# Patient Record
Sex: MALE | Race: BLACK OR AFRICAN AMERICAN | NOT HISPANIC OR LATINO | Employment: UNEMPLOYED | URBAN - METROPOLITAN AREA
[De-identification: names, ages, dates, MRNs, and addresses within clinical notes are randomized per-mention and may not be internally consistent; named-entity substitution may affect disease eponyms.]

---

## 2018-10-30 ENCOUNTER — HOSPITAL ENCOUNTER (EMERGENCY)
Facility: HOSPITAL | Age: 23
End: 2018-10-30
Attending: EMERGENCY MEDICINE
Payer: MEDICAID

## 2018-10-30 VITALS
WEIGHT: 148.81 LBS | TEMPERATURE: 98 F | OXYGEN SATURATION: 100 % | DIASTOLIC BLOOD PRESSURE: 67 MMHG | HEIGHT: 69 IN | HEART RATE: 61 BPM | RESPIRATION RATE: 18 BRPM | BODY MASS INDEX: 22.04 KG/M2 | SYSTOLIC BLOOD PRESSURE: 120 MMHG

## 2018-10-30 DIAGNOSIS — Z72.0 TOBACCO ABUSE: Primary | ICD-10-CM

## 2018-10-30 DIAGNOSIS — R03.0 ELEVATED BLOOD PRESSURE READING: ICD-10-CM

## 2018-10-30 DIAGNOSIS — T81.49XA SURGICAL WOUND INFECTION: ICD-10-CM

## 2018-10-30 LAB
ALBUMIN SERPL BCP-MCNC: 3.5 G/DL
ALP SERPL-CCNC: 46 U/L
ALT SERPL W/O P-5'-P-CCNC: 14 U/L
ANION GAP SERPL CALC-SCNC: 9 MMOL/L
AST SERPL-CCNC: 17 U/L
BASOPHILS # BLD AUTO: 0.03 K/UL
BASOPHILS NFR BLD: 0.5 %
BILIRUB SERPL-MCNC: 0.4 MG/DL
BUN SERPL-MCNC: 10 MG/DL
CALCIUM SERPL-MCNC: 9.6 MG/DL
CHLORIDE SERPL-SCNC: 104 MMOL/L
CO2 SERPL-SCNC: 25 MMOL/L
CREAT SERPL-MCNC: 1.1 MG/DL
CRP SERPL-MCNC: 4.7 MG/L
DIFFERENTIAL METHOD: NORMAL
EOSINOPHIL # BLD AUTO: 0.1 K/UL
EOSINOPHIL NFR BLD: 1.2 %
ERYTHROCYTE [DISTWIDTH] IN BLOOD BY AUTOMATED COUNT: 12.6 %
ERYTHROCYTE [SEDIMENTATION RATE] IN BLOOD BY WESTERGREN METHOD: 58 MM/HR
EST. GFR  (AFRICAN AMERICAN): >60 ML/MIN/1.73 M^2
EST. GFR  (NON AFRICAN AMERICAN): >60 ML/MIN/1.73 M^2
GLUCOSE SERPL-MCNC: 91 MG/DL
HCT VFR BLD AUTO: 46.5 %
HGB BLD-MCNC: 15.8 G/DL
LYMPHOCYTES # BLD AUTO: 2.1 K/UL
LYMPHOCYTES NFR BLD: 32.2 %
MCH RBC QN AUTO: 30.3 PG
MCHC RBC AUTO-ENTMCNC: 34 G/DL
MCV RBC AUTO: 89 FL
MONOCYTES # BLD AUTO: 0.5 K/UL
MONOCYTES NFR BLD: 8.4 %
NEUTROPHILS # BLD AUTO: 3.7 K/UL
NEUTROPHILS NFR BLD: 57.5 %
PLATELET # BLD AUTO: 227 K/UL
PMV BLD AUTO: 9.5 FL
POTASSIUM SERPL-SCNC: 4 MMOL/L
PROT SERPL-MCNC: 8.2 G/DL
RBC # BLD AUTO: 5.22 M/UL
SODIUM SERPL-SCNC: 138 MMOL/L
WBC # BLD AUTO: 6.45 K/UL

## 2018-10-30 PROCEDURE — 87070 CULTURE OTHR SPECIMN AEROBIC: CPT | Mod: 59

## 2018-10-30 PROCEDURE — 80053 COMPREHEN METABOLIC PANEL: CPT

## 2018-10-30 PROCEDURE — 85652 RBC SED RATE AUTOMATED: CPT

## 2018-10-30 PROCEDURE — 85025 COMPLETE CBC W/AUTO DIFF WBC: CPT

## 2018-10-30 PROCEDURE — 87040 BLOOD CULTURE FOR BACTERIA: CPT

## 2018-10-30 PROCEDURE — 87186 SC STD MICRODIL/AGAR DIL: CPT

## 2018-10-30 PROCEDURE — 86140 C-REACTIVE PROTEIN: CPT

## 2018-10-30 PROCEDURE — 25000003 PHARM REV CODE 250: Performed by: EMERGENCY MEDICINE

## 2018-10-30 PROCEDURE — 87077 CULTURE AEROBIC IDENTIFY: CPT

## 2018-10-30 PROCEDURE — 99284 EMERGENCY DEPT VISIT MOD MDM: CPT | Mod: 25

## 2018-10-30 PROCEDURE — 63600175 PHARM REV CODE 636 W HCPCS: Performed by: EMERGENCY MEDICINE

## 2018-10-30 PROCEDURE — 96365 THER/PROPH/DIAG IV INF INIT: CPT

## 2018-10-30 RX ORDER — VANCOMYCIN HCL IN 5 % DEXTROSE 1G/250ML
15 PLASTIC BAG, INJECTION (ML) INTRAVENOUS
Status: COMPLETED | OUTPATIENT
Start: 2018-10-30 | End: 2018-10-30

## 2018-10-30 RX ADMIN — VANCOMYCIN HYDROCHLORIDE 1000 MG: 1 INJECTION, POWDER, LYOPHILIZED, FOR SOLUTION INTRAVENOUS at 08:10

## 2018-10-30 NOTE — ED NOTES
"Confirmed with [facility] that ROCIO was received.  Per request of Dr. Berg, attempted to get in touch with surgeon that performed pts procedure. Was given the number to [facility] [phone], person answering the phone stated she couldn't help me unless I knew the name of the surgeon. Stated "we don't have access to that kind of information." Dr. Berg aware.   "

## 2018-10-30 NOTE — ED NOTES
Release of information has been signed by pt, faxed to [facility] medical records department as requested by Dr. Berg.

## 2018-10-30 NOTE — ED NOTES
Pt c/o R hand swelling and infection where pt had hand fracture surgery 2 months ago. Pt reports taking his cast off 3 weeks ago on his own, and did not follow up after surgery. Has not put any ointment on incision wound or taken any medication for pain. Noticed drainage 4 days ago.     Level of Consciousness: Patient is awake, alert, oriented to person, place, time, and situation.    Appearance: Pt sitting comfortably in stretcher, no acute distress at this time. Clothing appropriately placed and clean. Hygiene is appropriate.   Skin: Skin is warm, dry. Skin turgor is normal/elastic. Mucous membranes moist. Skin color is normal for ethnicity. Swelling and open wound approx 2 cm noted to dorsal R thumb where surgical incision was. Purulent and crusted drainage noted to site.   Musculoskeletal: Moves all extremities well. Full active ROM. Swelling noted to R dorsal aspect of 1st metacarpal area.  Denies any weakness. Gait steady, ambulates without use of assistive devices.   Respiratory: Airway open and patent. Respirations equal and unlabored. Breath sounds clear to auscultation. Denies any SOB.   Cardiac: Regular rate and rhythm.  Radial and pedal pulses present and normal. Capillary refill is within normal limits. Denies chest pain.    GI: Abdomen soft, non-tender to all quadrants with palpation. Bowel sounds present and active in all quads. Abdomen symmetric with no distention noted. Denies any N/V/D.    Neurological: Symmetrical expressions noted to face.  No obvious neurological deficits noted.   Psychosocial: Speech spontaneous, clear, and coherent. Appropriate to situation. Pt is calm and cooperative.     Pt informed of plan of care, verbalizes understanding, and denies any other questions, complaints, or concerns at this time. Bed in locked in lowest position, siderails up x2, call light within reach.  Will continue to monitor.

## 2018-10-30 NOTE — ED NOTES
Wound cleaned with hibiclens to obtain new drainage from site, but unable to express any purulent or bloody drainage from site. MD aware. States to wait a few minutes and see if it starts to drain on its own. Surgical pin is slightly visible with manipulation of superficial tissue. MD aware.

## 2018-10-30 NOTE — ED PROVIDER NOTES
History     Chief Complaint   Patient presents with    Abscess     reports breaking thumb and had cast on, but cut it off because he was aggravating him. Now thinks his pins are infected. Swelling with some drainage present to surgical site. Went to [facility] ED last night and LWBS.        Review of patient's allergies indicates:   Allergen Reactions    Banana Swelling     itching       History of Present Illness   HPI    10/30/2018, 7:05 AM  The history is provided by the patient    Cash Leon is a 23 y.o. male presenting to the ED for right hand swelling.  Patient is a right-hand dominant 23-year-old male presenting to the emergency room with right hand swelling.  Patient reports that he had fractured his hand approximately 2 months ago.  He was seen at [facility] in [place].  Patient reports that he has pins in his hand.  He states that he had his cast on up until approximately 3 weeks ago.  He states that he had removed his cast about 3 weeks ago.  He did not follow up with his orthopedist.  Reports that approximately 7 days ago, he started having swelling and pain to the right hand.  It is described as throbbing.  Nothing made it better, nothing made it worse.  He states that it started draining yellow pus.  Patient denies any fever, chills, body aches, numbness or tingling to the fingers.  There has been no prior treatment.      Arrival mode:  Personal Vehicle    PCP: Primary Doctor No     Allergies:  Review of patient's allergies indicates:   Allergen Reactions    Banana Swelling     itching       Past Medical History:  History reviewed. No pertinent past medical history.    Past Surgical History:  Past Surgical History:   Procedure Laterality Date    FRACTURE SURGERY      right hand         Family History:  History reviewed. No pertinent family history.    Social History:  Social History     Tobacco Use    Smoking status: Current Every Day Smoker     Packs/day: 1.00      Years: 10.00     Pack years: 10.00     Types: Cigarettes    Smokeless tobacco: Never Used   Substance and Sexual Activity    Alcohol use: No     Frequency: Never    Drug use: No    Sexual activity: Not on file        Review of Systems   Review of Systems   Constitutional: Negative for fever.   HENT: Negative for sore throat.    Eyes: Negative for redness.   Respiratory: Negative for shortness of breath.    Cardiovascular: Negative for chest pain.   Gastrointestinal: Negative for nausea.   Genitourinary: Negative for dysuria.   Musculoskeletal: Negative for back pain.        Right hand swelling with drainage   Skin: Negative for rash.   Neurological: Negative for weakness.   Hematological: Does not bruise/bleed easily.   Psychiatric/Behavioral: The patient is not nervous/anxious.           Physical Exam     Initial Vitals [10/30/18 0707]   BP Pulse Resp Temp SpO2   (!) 141/89 70 18 98.3 °F (36.8 °C) 100 %      MAP       --          Physical Exam    Nursing Notes and Vital Signs Reviewed.  Constitutional: Patient is in no apparent distress. Well-developed and well-nourished.  Head: Atraumatic. Normocephalic.  Eyes: PERRL. EOM intact. Conjunctivae are not pale. No scleral icterus.  ENT: Mucous membranes are moist. Oropharynx is clear and symmetric.    Neck: Supple. Full ROM. No lymphadenopathy.  Cardiovascular: Regular rate. Regular rhythm. No murmurs, rubs, or gallops. Distal pulses are 2+ and symmetric.  Pulmonary/Chest: No respiratory distress. Clear to auscultation bilaterally. No wheezing or rales.  Musculoskeletal: Moves all extremities. No obvious deformities. No edema. No calf tenderness  Right hand:  There is swelling noted to the right thenar eminence.  On the dorsal aspect there is yellow purulent drainage. There is no warmth or erythema noted.  There is full range of motion.  Intact radial, ulnar, and median nerves both motor and sensory.  Skin: Warm and dry.  Neurological:  Alert, awake, and  "appropriate.  Normal speech.  No acute focal neurological deficits are appreciated.  Psychiatric: Normal affect. Good eye contact. Appropriate in content.    Right hand volar aspect:  There is swelling noted to the right thenar eminence.      Right hand lateral aspect:  There is yellow crusting noted with spontaneous drainage of purulent.    Right dorsal hand:  There is crusting that is noted with yellow purulent drainage.         ED Course   Procedures  ED Vital Signs:  Vitals:    10/30/18 0707 10/30/18 0923   BP: (!) 141/89 120/67   Pulse: 70 61   Resp: 18 18   Temp: 98.3 °F (36.8 °C) 97.9 °F (36.6 °C)   TempSrc: Oral Oral   SpO2: 100% 100%   Weight: 67.5 kg (148 lb 13 oz)    Height: 5' 9" (1.753 m)        Abnormal Lab Results:  Labs Reviewed   SEDIMENTATION RATE - Abnormal; Notable for the following components:       Result Value    Sed Rate 58 (*)     All other components within normal limits   COMPREHENSIVE METABOLIC PANEL - Abnormal; Notable for the following components:    Alkaline Phosphatase 46 (*)     All other components within normal limits   CULTURE, BLOOD   CULTURE, BLOOD   CULTURE, AEROBIC  (SPECIFY SOURCE)   C-REACTIVE PROTEIN   CBC W/ AUTO DIFFERENTIAL        All Lab Results:  Results for orders placed or performed during the hospital encounter of 10/30/18   Sedimentation rate   Result Value Ref Range    Sed Rate 58 (H) 0 - 23 mm/Hr   C-reactive protein   Result Value Ref Range    CRP 4.7 0.0 - 8.2 mg/L   CBC auto differential   Result Value Ref Range    WBC 6.45 3.90 - 12.70 K/uL    RBC 5.22 4.60 - 6.20 M/uL    Hemoglobin 15.8 14.0 - 18.0 g/dL    Hematocrit 46.5 40.0 - 54.0 %    MCV 89 82 - 98 fL    MCH 30.3 27.0 - 31.0 pg    MCHC 34.0 32.0 - 36.0 g/dL    RDW 12.6 11.5 - 14.5 %    Platelets 227 150 - 350 K/uL    MPV 9.5 9.2 - 12.9 fL    Gran # (ANC) 3.7 1.8 - 7.7 K/uL    Lymph # 2.1 1.0 - 4.8 K/uL    Mono # 0.5 0.3 - 1.0 K/uL    Eos # 0.1 0.0 - 0.5 K/uL    Baso # 0.03 0.00 - 0.20 K/uL    Gran% 57.5 " 38.0 - 73.0 %    Lymph% 32.2 18.0 - 48.0 %    Mono% 8.4 4.0 - 15.0 %    Eosinophil% 1.2 0.0 - 8.0 %    Basophil% 0.5 0.0 - 1.9 %    Differential Method Automated    Comprehensive metabolic panel   Result Value Ref Range    Sodium 138 136 - 145 mmol/L    Potassium 4.0 3.5 - 5.1 mmol/L    Chloride 104 95 - 110 mmol/L    CO2 25 23 - 29 mmol/L    Glucose 91 70 - 110 mg/dL    BUN, Bld 10 6 - 20 mg/dL    Creatinine 1.1 0.5 - 1.4 mg/dL    Calcium 9.6 8.7 - 10.5 mg/dL    Total Protein 8.2 6.0 - 8.4 g/dL    Albumin 3.5 3.5 - 5.2 g/dL    Total Bilirubin 0.4 0.1 - 1.0 mg/dL    Alkaline Phosphatase 46 (L) 55 - 135 U/L    AST 17 10 - 40 U/L    ALT 14 10 - 44 U/L    Anion Gap 9 8 - 16 mmol/L    eGFR if African American >60.0 >60 mL/min/1.73 m^2    eGFR if non African American >60.0 >60 mL/min/1.73 m^2                    Imaging Results:  Imaging Results          X-Ray Hand 3 view Right (Final result)  Result time 10/30/18 08:01:31    Final result by ITZEL Hopkins Sr., MD (10/30/18 08:01:31)                 Impression:      There are 2 K-wires across a poorly visualized fracture in the proximal portion of the 1st metacarpal.      Electronically signed by: Howie Hopkins MD  Date:    10/30/2018  Time:    08:01             Narrative:    EXAMINATION:  XR HAND COMPLETE 3 VIEW RIGHT    CLINICAL HISTORY:  swelling;    COMPARISON:  None    FINDINGS:  There are 2 K-wires across a poorly visualized fracture in the proximal portion of the 1st metacarpal.  There is no dislocation.                               Independent read:  Pins present.  There is soft tissue swelling of the thenar eminence noted.                    The Emergency Provider reviewed the vital signs and test results, which are outlined above.     ED Discussion     7:33 AM discussed plan of care with patient.  Asked patient to remain NPO.  I discussed with patient that I plan to contact his orthopedic surgeon in [place] once we get lab tests and results back.  I did  discuss with him that we do have a orthopedic surgeon in [place].  Patient would prefer to maintain contact with the operating orthopedist.     7:58 AM   The yellow exudate/crusting was cleaned with Hibiclens.  There 2 ulcerative regions which are present.  No odor is noted. At times during the cleaning, the pin could be seen in felt.  No spontaneous drainage noted at the time.    Right Hand:  After cleaning with Hibicleans.      8:06 AM serosanguineous slightly cloudy drainage expressed from wound, culture obtained.    8:38 AM called [facility] [place] in [place], unable to get name of treating surgeon.  Patient does have a history of noncompliance.  Patient has pins that appears to be eroded through the skin.  There also is purulent discharge which is present as well as significant swelling of the thenar eminence.  I believe patient requires orthopedic consultation.  I did discuss with patient that [facility] has orthopedic specialty services available.  However as patient has established care with [facility], patient is agreeable and requesting transfer to [place].    All historical, clinical, radiographic, and laboratory findings were reviewed with the patient/family in detail.  I discussed the indications and treatment need (orthopedic consultation, possible washout removal of pins) for transfer  to an outside facility (rather than admission to our facility in [place]) secondary to patient request.  Patient/family verbalized understanding.  Patient is aware that [facility] has orthopedic surgery, however his initial surgery was performed at [facility].  He is requesting to return to [facility] in [place].  All remaining questions and concerns were addressed at that time and the patient/family agrees to proceed accordingly.  Similarly all pertinent details of the encounter were discussed with Mariaelena at [facility].  Dr. Grant who agrees to accept the patient in transfer  based on the needs/patient preferences outlined above.  Patient will be transferred by  Private vehicle by his mother.  Risks:    loss of vitals signs, permanent neurologic damage, MVC, inclement weather, resulting in death, or loss of neurologic function.  Benefits of transfer:orthopedic consultation.  Patient and family verbalized understanding.   Ursuladany Berg DO  9:24 AM      9:35 AM  Reassessment: Dr. Berg reassessed the pt.  Patient's mother is at bedside.  She is agreeable to be transferred to [facility] in [place].  I instructed that he should not have anything to eat or drink.  Initially, patient's mother did request to be transfer to [facility].  We discussed that he had already been accepted at [facility] in [facility], she and the patient are agreeable to go to [facility] in [facility].   The pt is resting comfortably and is NAD.    I discussed with patient and/or family/caretaker that evaluation in the ED does not suggest any emergent or life threatening medical conditions requiring immediate intervention beyond what was provided in the ED, and I believe patient is safe for discharge.  Regardless, an unremarkable evaluation in the ED does not preclude the development or presence of a serious of life threatening condition. As such, patient was instructed to return immediately for any worsening or change in current symptoms.    Tobacco abuse    Surgical wound infection    Elevated blood pressure reading    Other orders  -     X-Ray Hand 3 view Right; Standing  -     Saline lock IV; Standing  -     Cancel: Blood Culture #1 **CANNOT BE ORDERED STAT**; Standing  -     Cancel: Blood Culture #2 **CANNOT BE ORDERED STAT**; Standing  -     Cancel: Sedimentation rate; Standing  -     C-reactive protein; Standing  -     CBC auto differential; Standing  -     Comprehensive metabolic panel; Standing  -     Cancel: Pharmacy to dose Vancomycin consult; Standing  -     Cancel: Aerobic culture; Standing  -     vancomycin  in dextrose 5 % 1 gram/250 mL IVPB 1,000 mg  -     Cancel: Diet NPO; Standing  -     Misc nursing order (specify); Standing  -     RRC Facilitated Request; Standing        ED Medication(s):  Medications   vancomycin in dextrose 5 % 1 gram/250 mL IVPB 1,000 mg (0 mg/kg × 67.5 kg Intravenous Stopped 10/30/18 7289)          Medication List      You have not been prescribed any medications.                  MIPS Measures     Smoker? Yes     Hypertension: Pre-hypertension/Hypertension: The pt has been informed that they may have pre-hypertension or hypertension based on a blood pressure reading in the ED. I recommend that the pt call the PCP listed on their discharge instructions or a physician of their choice this week to arrange f/u for further evaluation of possible pre-hypertension or hypertension.          Medical Decision Making         Additional MDM:   Smoking Cessation: The patient is a smoker. The patient was counseled on smoking cessation for: 3 minutes. The patient was counseled on tobacco related  health complications.             Clinical Impression       ICD-10-CM ICD-9-CM   1. Tobacco abuse Z72.0 305.1   2. Surgical wound infection T81.49XA 998.59   3. Elevated blood pressure reading R03.0 796.2       Disposition:   Disposition: Transferred  Condition: Stable                  Ursula YOUNGChase Berg, DO  10/30/18 1432

## 2018-10-30 NOTE — ED NOTES
Pts mother at bedside and is requesting to drive pt to [facility] herself. Spoke with Dr. Berg, who states that's fine. IV pulled, wound covered. Mother given folder with copy of transfer sheet, chart, face sheet and CD of xray. States she will bring him directly to [facility], [address] [place] [address] in [place]. Explained to mother that it's extremely important that pt been seen there today due to suspicion of infection. Pts mother verbalized understanding.

## 2018-10-30 NOTE — ED NOTES
Speaking with Mariaelena with regional referral center. Accepted to [facility] by MD Rudy in ED. Report to be called to [phone].

## 2018-11-01 LAB — BACTERIA SPEC AEROBE CULT: NORMAL

## 2018-11-04 LAB
BACTERIA BLD CULT: NORMAL
BACTERIA BLD CULT: NORMAL

## 2020-11-24 ENCOUNTER — HOSPITAL ENCOUNTER (EMERGENCY)
Facility: HOSPITAL | Age: 25
Discharge: HOME | End: 2020-11-24
Attending: EMERGENCY MEDICINE
Payer: MEDICAID

## 2020-11-24 VITALS
RESPIRATION RATE: 18 BRPM | TEMPERATURE: 99 F | SYSTOLIC BLOOD PRESSURE: 127 MMHG | OXYGEN SATURATION: 99 % | DIASTOLIC BLOOD PRESSURE: 66 MMHG | HEART RATE: 72 BPM

## 2020-11-24 DIAGNOSIS — Z00.8 MEDICAL CLEARANCE FOR INCARCERATION: Primary | ICD-10-CM

## 2020-11-24 LAB — SARS-COV-2 RDRP RESP QL NAA+PROBE: NEGATIVE

## 2020-11-24 PROCEDURE — U0002 COVID-19 LAB TEST NON-CDC: HCPCS

## 2020-11-24 PROCEDURE — 99282 EMERGENCY DEPT VISIT SF MDM: CPT

## 2020-11-24 NOTE — ED PROVIDER NOTES
SCRIBE #1 NOTE: I, Ramy Condon, am scribing for, and in the presence of, Heri Mendez Jr., MD. I have scribed the entire note.      History      Chief Complaint   Patient presents with    COVID-19 Concerns       Review of patient's allergies indicates:   Allergen Reactions    Banana Swelling     itching        HPI   HPI    11/24/2020, 1:48 PM   History obtained from the patient      History of Present Illness: Cash Leon is a 25 y.o. male patient who presents to the Emergency Department for covid-19 concerns, onset just PTA. Pt needs medical clearance before transport to [facility]. Symptoms are constant and moderate in severity. No mitigating or exacerbating factors reported. No associated sxs reported. Patient denies any fever, chills, CP, SOB, cough, congestion, rhinorrhea, HA, abd pain, n/v/d, and all other sxs at this time. No prior Tx reported. No further complaints or concerns at this time.         Arrival mode: Police/care home transportation    PCP: Primary Doctor No       Past Medical History:  History reviewed. No pertinent medical history.     Past Surgical History:  Past Surgical History:   Procedure Laterality Date    FRACTURE SURGERY      right hand         Family History:  History reviewed. No pertinent family history.     Social History:  Social History     Tobacco Use    Smoking status: Current Every Day Smoker     Packs/day: 1.00     Years: 10.00     Pack years: 10.00     Types: Cigarettes    Smokeless tobacco: Never Used   Substance and Sexual Activity    Alcohol use: No     Frequency: Never    Drug use: No    Sexual activity: Not on file       ROS   Review of Systems   Constitutional: Negative for chills, diaphoresis, fatigue and fever.   HENT: Negative for congestion, rhinorrhea and sore throat.    Respiratory: Negative for cough and shortness of breath.    Cardiovascular: Negative for chest pain.   Gastrointestinal: Negative for abdominal pain, constipation, diarrhea,  nausea and vomiting.   Genitourinary: Negative for dysuria and flank pain.   Musculoskeletal: Negative for back pain.   Skin: Negative for rash.   Neurological: Negative for dizziness, syncope, weakness, light-headedness, numbness and headaches.   Hematological: Does not bruise/bleed easily.   All other systems reviewed and are negative.      Physical Exam      Initial Vitals [11/24/20 1408]   BP Pulse Resp Temp SpO2   127/66 72 18 98.5 °F (36.9 °C) 99 %      MAP       --          Physical Exam  Nursing Notes and Vital Signs Reviewed.  Constitutional: Patient is in no acute distress. Well-developed and well-nourished.  Head: Atraumatic. Normocephalic.  Eyes:  EOM intact. Conjunctivae are not pale. No scleral icterus.  ENT: Mucous membranes are moist. Oropharynx is clear and symmetric.  nares clear  Neck:  Full ROM. No lymphadenopathy.  Trachea midline  Cardiovascular: Regular rate. Regular rhythm. No murmurs, rubs, or gallops. Distal pulses are 2+ and symmetric.  Pulmonary/Chest: No respiratory distress. Clear to auscultation bilaterally. No wheezing or rales.  Musculoskeletal: Moves all extremities. No obvious deformities. No edema. No calf tenderness.  Skin: Warm and dry.  Neurological:  Alert, awake, and appropriate.  Normal speech.  No acute focal neurological deficits are appreciated.  Psychiatric: Normal affect. Good eye contact. Appropriate in content.    ED Course    Procedures  ED Vital Signs:  Vitals:    11/24/20 1408   BP: 127/66   Pulse: 72   Resp: 18   Temp: 98.5 °F (36.9 °C)   TempSrc: Oral   SpO2: 99%       Abnormal Lab Results:  Labs Reviewed   SARS-COV-2 RNA AMPLIFICATION, QUAL        All Lab Results:  Results for orders placed or performed during the hospital encounter of 11/24/20   COVID-19 Rapid Screening   Result Value Ref Range    SARS-CoV-2 RNA, Amplification, Qual Negative Negative         Imaging Results:  Imaging Results    None                 The Emergency Provider reviewed the vital  signs and test results, which are outlined above.    ED Discussion     3:23 PM: Reassessed pt at this time.   Discussed with pt all pertinent ED information and results. Discussed pt dx and plan of tx. Gave pt all f/u and return to the ED instructions. All questions and concerns were addressed at this time. Pt expresses understanding of information and instructions, and is comfortable with plan to discharge. Pt is stable for discharge.    I discussed with patient and/or family/caretaker that evaluation in the ED does not suggest any emergent or life threatening medical conditions requiring immediate intervention beyond what was provided in the ED, and I believe patient is safe for discharge.  Regardless, an unremarkable evaluation in the ED does not preclude the development or presence of a serious of life threatening condition. As such, patient was instructed to return immediately for any worsening or change in current symptoms.           ED Medication(s):  Medications - No data to display        New Prescriptions    No medications on file       Medical Decision Making    Medical Decision Making:   Clinical Tests:   Lab Tests: Ordered and Reviewed           Scribe Attestation:   Scribe #1: I performed the above scribed service and the documentation accurately describes the services I performed. I attest to the accuracy of the note.    Attending:   Physician Attestation Statement for Scribe #1: I, Heri Mendez Jr., MD, personally performed the services described in this documentation, as scribed by Ramy Condon, in my presence, and it is both accurate and complete.          Clinical Impression       ICD-10-CM ICD-9-CM   1. Medical clearance for incarceration  Z00.8 V70.8       Disposition:   Disposition: Discharged  Condition: Stable         Heri Mendez Jr., MD  11/24/20 1529